# Patient Record
Sex: MALE | URBAN - METROPOLITAN AREA
[De-identification: names, ages, dates, MRNs, and addresses within clinical notes are randomized per-mention and may not be internally consistent; named-entity substitution may affect disease eponyms.]

---

## 2020-12-30 ENCOUNTER — OFFICE VISIT (OUTPATIENT)
Dept: URBAN - METROPOLITAN AREA CLINIC 11 | Facility: CLINIC | Age: 65
End: 2020-12-30
Payer: MEDICARE

## 2020-12-30 DIAGNOSIS — H52.4 PRESBYOPIA: ICD-10-CM

## 2020-12-30 DIAGNOSIS — H04.123 DRY EYE SYNDROME OF BILATERAL LACRIMAL GLANDS: ICD-10-CM

## 2020-12-30 DIAGNOSIS — H25.813 COMBINED FORMS OF AGE-RELATED CATARACT, BILATERAL: Primary | ICD-10-CM

## 2020-12-30 PROCEDURE — 92004 COMPRE OPH EXAM NEW PT 1/>: CPT | Performed by: OPTOMETRIST

## 2020-12-30 ASSESSMENT — VISUAL ACUITY
OD: 20/25
OS: 20/30

## 2020-12-30 ASSESSMENT — INTRAOCULAR PRESSURE
OS: 10
OD: 12

## 2020-12-30 ASSESSMENT — KERATOMETRY
OS: 42.25
OD: 42.38

## 2020-12-30 NOTE — IMPRESSION/PLAN
Impression: Combined forms of age-related cataract, bilateral: H25.813. Plan: Discussed diagnosis in detail with patient. No treatment is required at this time. Will continue to observe condition and or symptoms. Call if [address] worsens.